# Patient Record
Sex: FEMALE | Race: OTHER | NOT HISPANIC OR LATINO
[De-identification: names, ages, dates, MRNs, and addresses within clinical notes are randomized per-mention and may not be internally consistent; named-entity substitution may affect disease eponyms.]

---

## 2019-01-15 PROBLEM — Z00.00 ENCOUNTER FOR PREVENTIVE HEALTH EXAMINATION: Status: ACTIVE | Noted: 2019-01-15

## 2019-02-12 ENCOUNTER — ASOB RESULT (OUTPATIENT)
Age: 39
End: 2019-02-12

## 2019-02-12 ENCOUNTER — APPOINTMENT (OUTPATIENT)
Dept: MATERNAL FETAL MEDICINE | Facility: CLINIC | Age: 39
End: 2019-02-12

## 2019-02-12 ENCOUNTER — APPOINTMENT (OUTPATIENT)
Dept: ANTEPARTUM | Facility: CLINIC | Age: 39
End: 2019-02-12
Payer: COMMERCIAL

## 2019-02-12 DIAGNOSIS — O35.1XX0 MATERNAL CARE FOR (SUSPECTED) CHROMOSOMAL ABNORMALITY IN FETUS, NOT APPLICABLE OR UNSPECIFIED: ICD-10-CM

## 2019-02-12 PROCEDURE — 76813 OB US NUCHAL MEAS 1 GEST: CPT

## 2019-02-12 PROCEDURE — 36416 COLLJ CAPILLARY BLOOD SPEC: CPT

## 2019-02-28 LAB
1ST TRIMESTER DATA: NORMAL
ADDENDUM DOC: NORMAL
AFP PNL SERPL: NORMAL
AFP SERPL-ACNC: NORMAL
CLINICAL BIOCHEMIST REVIEW: NORMAL
FREE BETA HCG 1ST TRIMESTER: NORMAL
Lab: NORMAL
NOTES NTD: NORMAL
NT: NORMAL
PAPP-A SERPL-ACNC: NORMAL
TRISOMY 18/3: NORMAL

## 2019-03-12 ENCOUNTER — APPOINTMENT (OUTPATIENT)
Dept: MATERNAL FETAL MEDICINE | Facility: CLINIC | Age: 39
End: 2019-03-12

## 2019-04-09 ENCOUNTER — APPOINTMENT (OUTPATIENT)
Dept: ANTEPARTUM | Facility: CLINIC | Age: 39
End: 2019-04-09

## 2019-04-20 ENCOUNTER — OUTPATIENT (OUTPATIENT)
Dept: INPATIENT UNIT | Facility: HOSPITAL | Age: 39
LOS: 1 days | Discharge: HOME | End: 2019-04-20

## 2019-04-20 VITALS — HEART RATE: 84 BPM | DIASTOLIC BLOOD PRESSURE: 77 MMHG | SYSTOLIC BLOOD PRESSURE: 132 MMHG

## 2019-04-20 VITALS
SYSTOLIC BLOOD PRESSURE: 117 MMHG | HEART RATE: 84 BPM | DIASTOLIC BLOOD PRESSURE: 74 MMHG | TEMPERATURE: 99 F | RESPIRATION RATE: 18 BRPM

## 2019-04-20 DIAGNOSIS — O03.80 UNSPECIFIED COMPLICATION FOLLOWING COMPLETE OR UNSPECIFIED SPONTANEOUS ABORTION: Chronic | ICD-10-CM

## 2019-04-20 DIAGNOSIS — O03.9 COMPLETE OR UNSPECIFIED SPONTANEOUS ABORTION WITHOUT COMPLICATION: Chronic | ICD-10-CM

## 2019-04-20 DIAGNOSIS — O26.899 OTHER SPECIFIED PREGNANCY RELATED CONDITIONS, UNSPECIFIED TRIMESTER: ICD-10-CM

## 2019-04-20 DIAGNOSIS — Z3A.00 WEEKS OF GESTATION OF PREGNANCY NOT SPECIFIED: ICD-10-CM

## 2019-04-20 DIAGNOSIS — Z33.2 ENCOUNTER FOR ELECTIVE TERMINATION OF PREGNANCY: Chronic | ICD-10-CM

## 2019-04-20 RX ORDER — INDOMETHACIN 50 MG
50 CAPSULE ORAL ONCE
Qty: 0 | Refills: 0 | Status: COMPLETED | OUTPATIENT
Start: 2019-04-20 | End: 2019-04-20

## 2019-04-20 RX ORDER — INDOMETHACIN 50 MG
25 CAPSULE ORAL
Qty: 8 | Refills: 0
Start: 2019-04-20 | End: 2019-04-21

## 2019-04-20 RX ADMIN — Medication 50 MILLIGRAM(S): at 13:36

## 2019-04-20 NOTE — OB PROVIDER TRIAGE NOTE - ADDITIONAL INSTRUCTIONS
discharge home with indocin 25 mg q 6 hr for 48 hours  -increase vaginal progesterone to 200 mg at night   -Follow up in ATU monday

## 2019-04-20 NOTE — OB PROVIDER TRIAGE NOTE - NSOBPROVIDERNOTE_OBGYN_ALL_OB_FT
Pt of Dr. Maisha Norton. 38 yo  @ 21 4/7 weeks sent in from the office for short cervix (1 cm with funneling in the office today) and for evaluation for rescue cerclage. Pt has been followed for short cervix. 2 weeks ago cl 2.8cm. Pt reports good fetal movement. Denies lof, or vaginal bleeding.     Current a/p complications: IVF on vaginal progesterone 100 mg at night , progesterone im (1ml?) every other day subchorionic hematoma- resolved. Twin pregnancy initially "vanishing twin"    Allergies: NKDA  Medications: PNV, vaginal progesterone 100 mg, progesterone 1 ml every other day   PMHx: Denies   PSHx: 2013 laparoscopic cervical polypectomy, 2016 D&C  OB: 2016 IVF trisomy 21 d&c 8 weeks   sab  3/4 weeks   GYN: hx of trichomonas   hx of fibroids   hx of ovarian cysts   Social: Denies   Psychosocial: Denies     Assessment/plan:  Vital Signs Last 24 Hrs  T(C): 37 (2019 11:09), Max: 37 (2019 11:09)  T(F): 98.6 (2019 11:09), Max: 98.6 (2019 11:09)  HR: 84 (2019 12:11) (84 - 84)  BP: 132/77 (2019 12:11) (117/74 - 132/77)  BP(mean): --  RR: 18 (2019 11:09) (18 - 18)    TAUS Breech presentation, posterior placenta, mvp 4.99 cm    SSE cervix appears closed     TVUS CL 1.5-1.8 cm funneling noted, no dynamic changes     irregular contractions noted by toco    d/w Dr. Chang,  -Give indocin 50 mg now, discharge home with indocin 25 mg q 6 hr for 48 hours  -increase vaginal progesterone to 200 mg at night   -Follow up in ATU monday     Plan d/w Dr. Maisha Norton

## 2019-04-20 NOTE — OB RN TRIAGE NOTE - PSH
, spontaneous  2018 IVF sab @ 3 weeks  2018 IVF sab @ 4 weeks  , spontaneous complete with complication   D&C for Trisomy 21 , spontaneous  2018 IVF sab @ 3 weeks  2018 IVF sab @ 4 weeks  Induced termination of pregnancy  trisomy 21 2016

## 2019-04-20 NOTE — OB RN TRIAGE NOTE - CURRENT PREGNANCY COMPLICATIONS, OB PROFILE
subchorionic hematoma resolved at 13 weeks vanishing twin, subchorionic hematoma resolved at 13 weeks

## 2019-04-22 ENCOUNTER — APPOINTMENT (OUTPATIENT)
Dept: ANTEPARTUM | Facility: CLINIC | Age: 39
End: 2019-04-22
Payer: COMMERCIAL

## 2019-04-22 ENCOUNTER — ASOB RESULT (OUTPATIENT)
Age: 39
End: 2019-04-22

## 2019-04-22 PROBLEM — N84.0 POLYP OF CORPUS UTERI: Chronic | Status: ACTIVE | Noted: 2019-04-20

## 2019-04-22 PROBLEM — N83.209 UNSPECIFIED OVARIAN CYST, UNSPECIFIED SIDE: Chronic | Status: ACTIVE | Noted: 2019-04-20

## 2019-04-22 PROCEDURE — 99242 OFF/OP CONSLTJ NEW/EST SF 20: CPT | Mod: 25

## 2019-04-22 PROCEDURE — 76811 OB US DETAILED SNGL FETUS: CPT

## 2019-04-22 PROCEDURE — 76817 TRANSVAGINAL US OBSTETRIC: CPT

## 2019-04-22 PROCEDURE — 59020 FETAL CONTRACT STRESS TEST: CPT

## 2019-04-24 ENCOUNTER — APPOINTMENT (OUTPATIENT)
Dept: ANTEPARTUM | Facility: CLINIC | Age: 39
End: 2019-04-24
Payer: COMMERCIAL

## 2019-04-24 ENCOUNTER — ASOB RESULT (OUTPATIENT)
Age: 39
End: 2019-04-24

## 2019-04-24 PROCEDURE — 76817 TRANSVAGINAL US OBSTETRIC: CPT

## 2019-05-01 ENCOUNTER — APPOINTMENT (OUTPATIENT)
Dept: ANTEPARTUM | Facility: CLINIC | Age: 39
End: 2019-05-01
Payer: COMMERCIAL

## 2019-05-01 ENCOUNTER — ASOB RESULT (OUTPATIENT)
Age: 39
End: 2019-05-01

## 2019-05-01 PROCEDURE — 76817 TRANSVAGINAL US OBSTETRIC: CPT

## 2019-05-01 PROCEDURE — 76815 OB US LIMITED FETUS(S): CPT

## 2019-07-18 ENCOUNTER — TRANSCRIPTION ENCOUNTER (OUTPATIENT)
Age: 39
End: 2019-07-18

## 2019-07-19 ENCOUNTER — INPATIENT (INPATIENT)
Facility: HOSPITAL | Age: 39
LOS: 2 days | Discharge: HOME | End: 2019-07-22
Attending: OBSTETRICS & GYNECOLOGY | Admitting: OBSTETRICS & GYNECOLOGY
Payer: COMMERCIAL

## 2019-07-19 ENCOUNTER — RESULT REVIEW (OUTPATIENT)
Age: 39
End: 2019-07-19

## 2019-07-19 VITALS
RESPIRATION RATE: 18 BRPM | DIASTOLIC BLOOD PRESSURE: 88 MMHG | SYSTOLIC BLOOD PRESSURE: 143 MMHG | HEART RATE: 97 BPM | TEMPERATURE: 99 F

## 2019-07-19 DIAGNOSIS — O03.9 COMPLETE OR UNSPECIFIED SPONTANEOUS ABORTION WITHOUT COMPLICATION: Chronic | ICD-10-CM

## 2019-07-19 DIAGNOSIS — O26.899 OTHER SPECIFIED PREGNANCY RELATED CONDITIONS, UNSPECIFIED TRIMESTER: ICD-10-CM

## 2019-07-19 DIAGNOSIS — Z3A.00 WEEKS OF GESTATION OF PREGNANCY NOT SPECIFIED: ICD-10-CM

## 2019-07-19 DIAGNOSIS — Z33.2 ENCOUNTER FOR ELECTIVE TERMINATION OF PREGNANCY: Chronic | ICD-10-CM

## 2019-07-19 LAB
BASOPHILS # BLD AUTO: 0.05 K/UL — SIGNIFICANT CHANGE UP (ref 0–0.2)
BASOPHILS NFR BLD AUTO: 0.6 % — SIGNIFICANT CHANGE UP (ref 0–2)
BLD GP AB SCN SERPL QL: NEGATIVE — SIGNIFICANT CHANGE UP
EOSINOPHIL # BLD AUTO: 0.12 K/UL — SIGNIFICANT CHANGE UP (ref 0–0.5)
EOSINOPHIL NFR BLD AUTO: 1.3 % — SIGNIFICANT CHANGE UP (ref 0–6)
HCT VFR BLD CALC: 34.3 % — LOW (ref 34.5–45)
HGB BLD-MCNC: 10.6 G/DL — LOW (ref 11.5–15.5)
HIV COMBO RESULT: SIGNIFICANT CHANGE UP
HIV1+2 AB SPEC QL: SIGNIFICANT CHANGE UP
IMM GRANULOCYTES NFR BLD AUTO: 1.7 % — HIGH (ref 0–1.5)
LYMPHOCYTES # BLD AUTO: 1.86 K/UL — SIGNIFICANT CHANGE UP (ref 1–3.3)
LYMPHOCYTES # BLD AUTO: 20.8 % — SIGNIFICANT CHANGE UP (ref 13–44)
MCHC RBC-ENTMCNC: 26.7 PG — LOW (ref 27–34)
MCHC RBC-ENTMCNC: 30.9 % — LOW (ref 32–36)
MCV RBC AUTO: 86.4 FL — SIGNIFICANT CHANGE UP (ref 80–100)
MONOCYTES # BLD AUTO: 0.59 K/UL — SIGNIFICANT CHANGE UP (ref 0–0.9)
MONOCYTES NFR BLD AUTO: 6.6 % — SIGNIFICANT CHANGE UP (ref 2–14)
NEUTROPHILS # BLD AUTO: 6.19 K/UL — SIGNIFICANT CHANGE UP (ref 1.8–7.4)
NEUTROPHILS NFR BLD AUTO: 69 % — SIGNIFICANT CHANGE UP (ref 43–77)
NRBC # FLD: 0 K/UL — SIGNIFICANT CHANGE UP (ref 0–0)
PLATELET # BLD AUTO: 262 K/UL — SIGNIFICANT CHANGE UP (ref 150–400)
PMV BLD: 9.9 FL — SIGNIFICANT CHANGE UP (ref 7–13)
RBC # BLD: 3.97 M/UL — SIGNIFICANT CHANGE UP (ref 3.8–5.2)
RBC # FLD: 14.3 % — SIGNIFICANT CHANGE UP (ref 10.3–14.5)
RH IG SCN BLD-IMP: POSITIVE — SIGNIFICANT CHANGE UP
WBC # BLD: 8.96 K/UL — SIGNIFICANT CHANGE UP (ref 3.8–10.5)
WBC # FLD AUTO: 8.96 K/UL — SIGNIFICANT CHANGE UP (ref 3.8–10.5)

## 2019-07-19 PROCEDURE — 88307 TISSUE EXAM BY PATHOLOGIST: CPT | Mod: 26

## 2019-07-19 PROCEDURE — 59514 CESAREAN DELIVERY ONLY: CPT | Mod: U7

## 2019-07-19 RX ORDER — OXYTOCIN 10 UNIT/ML
41.67 VIAL (ML) INJECTION
Qty: 20 | Refills: 0 | Status: DISCONTINUED | OUTPATIENT
Start: 2019-07-19 | End: 2019-07-20

## 2019-07-19 RX ORDER — MAGNESIUM HYDROXIDE 400 MG/1
30 TABLET, CHEWABLE ORAL
Refills: 0 | Status: DISCONTINUED | OUTPATIENT
Start: 2019-07-19 | End: 2019-07-22

## 2019-07-19 RX ORDER — CEFAZOLIN SODIUM 1 G
2000 VIAL (EA) INJECTION ONCE
Refills: 0 | Status: DISCONTINUED | OUTPATIENT
Start: 2019-07-19 | End: 2019-07-22

## 2019-07-19 RX ORDER — KETOROLAC TROMETHAMINE 30 MG/ML
30 SYRINGE (ML) INJECTION EVERY 6 HOURS
Refills: 0 | Status: DISCONTINUED | OUTPATIENT
Start: 2019-07-19 | End: 2019-07-20

## 2019-07-19 RX ORDER — CITRIC ACID/SODIUM CITRATE 300-500 MG
30 SOLUTION, ORAL ORAL ONCE
Refills: 0 | Status: DISCONTINUED | OUTPATIENT
Start: 2019-07-19 | End: 2019-07-19

## 2019-07-19 RX ORDER — GLYCERIN ADULT
1 SUPPOSITORY, RECTAL RECTAL AT BEDTIME
Refills: 0 | Status: DISCONTINUED | OUTPATIENT
Start: 2019-07-19 | End: 2019-07-22

## 2019-07-19 RX ORDER — METOCLOPRAMIDE HCL 10 MG
10 TABLET ORAL ONCE
Refills: 0 | Status: DISCONTINUED | OUTPATIENT
Start: 2019-07-20 | End: 2019-07-20

## 2019-07-19 RX ORDER — OXYCODONE HYDROCHLORIDE 5 MG/1
10 TABLET ORAL
Refills: 0 | Status: DISCONTINUED | OUTPATIENT
Start: 2019-07-20 | End: 2019-07-20

## 2019-07-19 RX ORDER — LANOLIN
1 OINTMENT (GRAM) TOPICAL EVERY 6 HOURS
Refills: 0 | Status: DISCONTINUED | OUTPATIENT
Start: 2019-07-19 | End: 2019-07-22

## 2019-07-19 RX ORDER — ONDANSETRON 8 MG/1
4 TABLET, FILM COATED ORAL EVERY 6 HOURS
Refills: 0 | Status: DISCONTINUED | OUTPATIENT
Start: 2019-07-20 | End: 2019-07-20

## 2019-07-19 RX ORDER — OXYTOCIN 10 UNIT/ML
333.33 VIAL (ML) INJECTION
Qty: 20 | Refills: 0 | Status: DISCONTINUED | OUTPATIENT
Start: 2019-07-19 | End: 2019-07-20

## 2019-07-19 RX ORDER — FAMOTIDINE 10 MG/ML
20 INJECTION INTRAVENOUS ONCE
Refills: 0 | Status: DISCONTINUED | OUTPATIENT
Start: 2019-07-19 | End: 2019-07-19

## 2019-07-19 RX ORDER — OXYCODONE HYDROCHLORIDE 5 MG/1
5 TABLET ORAL
Refills: 0 | Status: DISCONTINUED | OUTPATIENT
Start: 2019-07-20 | End: 2019-07-20

## 2019-07-19 RX ORDER — IBUPROFEN 200 MG
600 TABLET ORAL EVERY 6 HOURS
Refills: 0 | Status: COMPLETED | OUTPATIENT
Start: 2019-07-19 | End: 2020-06-16

## 2019-07-19 RX ORDER — NALOXONE HYDROCHLORIDE 4 MG/.1ML
0.1 SPRAY NASAL
Refills: 0 | Status: DISCONTINUED | OUTPATIENT
Start: 2019-07-20 | End: 2019-07-20

## 2019-07-19 RX ORDER — SODIUM CHLORIDE 9 MG/ML
1000 INJECTION, SOLUTION INTRAVENOUS
Refills: 0 | Status: DISCONTINUED | OUTPATIENT
Start: 2019-07-19 | End: 2019-07-20

## 2019-07-19 RX ORDER — SIMETHICONE 80 MG/1
80 TABLET, CHEWABLE ORAL EVERY 4 HOURS
Refills: 0 | Status: DISCONTINUED | OUTPATIENT
Start: 2019-07-19 | End: 2019-07-22

## 2019-07-19 RX ORDER — METOCLOPRAMIDE HCL 10 MG
10 TABLET ORAL ONCE
Refills: 0 | Status: DISCONTINUED | OUTPATIENT
Start: 2019-07-19 | End: 2019-07-19

## 2019-07-19 RX ORDER — OXYTOCIN 10 UNIT/ML
333.33 VIAL (ML) INJECTION
Qty: 20 | Refills: 0 | Status: DISCONTINUED | OUTPATIENT
Start: 2019-07-19 | End: 2019-07-19

## 2019-07-19 RX ORDER — DIPHENHYDRAMINE HCL 50 MG
25 CAPSULE ORAL EVERY 6 HOURS
Refills: 0 | Status: DISCONTINUED | OUTPATIENT
Start: 2019-07-19 | End: 2019-07-22

## 2019-07-19 RX ORDER — ACETAMINOPHEN 500 MG
975 TABLET ORAL
Refills: 0 | Status: DISCONTINUED | OUTPATIENT
Start: 2019-07-19 | End: 2019-07-22

## 2019-07-19 RX ORDER — SODIUM CHLORIDE 9 MG/ML
1000 INJECTION, SOLUTION INTRAVENOUS ONCE
Refills: 0 | Status: DISCONTINUED | OUTPATIENT
Start: 2019-07-19 | End: 2019-07-19

## 2019-07-19 RX ORDER — OXYCODONE HYDROCHLORIDE 5 MG/1
5 TABLET ORAL ONCE
Refills: 0 | Status: DISCONTINUED | OUTPATIENT
Start: 2019-07-19 | End: 2019-07-22

## 2019-07-19 RX ORDER — DOCUSATE SODIUM 100 MG
100 CAPSULE ORAL
Refills: 0 | Status: DISCONTINUED | OUTPATIENT
Start: 2019-07-19 | End: 2019-07-22

## 2019-07-19 RX ORDER — TETANUS TOXOID, REDUCED DIPHTHERIA TOXOID AND ACELLULAR PERTUSSIS VACCINE, ADSORBED 5; 2.5; 8; 8; 2.5 [IU]/.5ML; [IU]/.5ML; UG/.5ML; UG/.5ML; UG/.5ML
0.5 SUSPENSION INTRAMUSCULAR ONCE
Refills: 0 | Status: DISCONTINUED | OUTPATIENT
Start: 2019-07-19 | End: 2019-07-22

## 2019-07-19 RX ORDER — AZITHROMYCIN 500 MG/1
500 TABLET, FILM COATED ORAL ONCE
Refills: 0 | Status: DISCONTINUED | OUTPATIENT
Start: 2019-07-19 | End: 2019-07-19

## 2019-07-19 RX ORDER — OXYCODONE HYDROCHLORIDE 5 MG/1
5 TABLET ORAL
Refills: 0 | Status: DISCONTINUED | OUTPATIENT
Start: 2019-07-19 | End: 2019-07-22

## 2019-07-19 RX ORDER — SODIUM CHLORIDE 9 MG/ML
1000 INJECTION, SOLUTION INTRAVENOUS
Refills: 0 | Status: DISCONTINUED | OUTPATIENT
Start: 2019-07-19 | End: 2019-07-19

## 2019-07-19 RX ORDER — HYDROMORPHONE HYDROCHLORIDE 2 MG/ML
1 INJECTION INTRAMUSCULAR; INTRAVENOUS; SUBCUTANEOUS
Refills: 0 | Status: DISCONTINUED | OUTPATIENT
Start: 2019-07-20 | End: 2019-07-20

## 2019-07-19 RX ADMIN — FAMOTIDINE 20 MILLIGRAM(S): 10 INJECTION INTRAVENOUS at 20:26

## 2019-07-19 RX ADMIN — Medication 125 MILLIUNIT(S)/MIN: at 23:43

## 2019-07-19 RX ADMIN — Medication 30 MILLILITER(S): at 20:26

## 2019-07-19 RX ADMIN — SODIUM CHLORIDE 75 MILLILITER(S): 9 INJECTION, SOLUTION INTRAVENOUS at 23:42

## 2019-07-19 NOTE — OB PROVIDER TRIAGE NOTE - HISTORY OF PRESENT ILLNESS
Patient is a 40y/o  @ 34 3/7wks gest.  who reports to triage with c/o ruptured membranes since 1800 with mild contractions (2/10 on the pain scale).   Reports good fetal movements.  Pregnancy is a result of IVF.  Patient last ate @ 1500.      AP Course - GDMA1      - Short cervix; on progesterone      - vanishing twin @ 5wks gest.      - subchorionic hematoma @ 13 wks.  Meds - pnv & progesterone  NKDA

## 2019-07-19 NOTE — OB PROVIDER H&P - ASSESSMENT
Patient is a 38y/o  @ 34 3/7wks gest. with srom @ 1800 in labor.  For 1' C/S for breech presentation.

## 2019-07-19 NOTE — OB RN TRIAGE NOTE - PSH
, spontaneous  2018 IVF sab @ 3 weeks  2018 IVF sab @ 4 weeks  Induced termination of pregnancy  trisomy 21 2016

## 2019-07-19 NOTE — OB PROVIDER TRIAGE NOTE - NSHPPHYSICALEXAM_GEN_ALL_CORE
Abdomen gravid, soft and nontender  Grossly ruptured; clear fluid  SVE - 3/70/-3  Complete breech presentation  EFW today in 6lbs 1oz  GBS - unknown

## 2019-07-19 NOTE — OB PROVIDER TRIAGE NOTE - NS_OBGYNHISTORY_OBGYN_ALL_OB_FT
OB  - SAB -  2016  - SAB of IVF fetus - 9 /2018 OB  - SAB -  2016  - SAB of IVF fetus - 9 /2018  GYN  -ovarian cyst removed - 2014  -uterine/cervical polypectomy - 2013

## 2019-07-19 NOTE — OB RN TRIAGE NOTE - CURRENT PREGNANCY COMPLICATIONS, OB PROFILE
Gestational Diabetes/vanishing twin, subchorionic hematoma resolved at  13weeks, short cervix , breech Gestational Diabetes/Gestational Age less than 36 Weeks/vanishing twin, subchorionic hematoma resolved at  13weeks, short cervix , breech

## 2019-07-19 NOTE — OB PROVIDER H&P - NS_OBGYNHISTORY_OBGYN_ALL_OB_FT
OB  - SAB -  2016  - SAB of IVF fetus - 9 /2018  GYN  -ovarian cyst removed - 2014  -uterine/cervical polypectomy - 2013

## 2019-07-19 NOTE — OB PROVIDER TRIAGE NOTE - NSOBPROVIDERNOTE_OBGYN_ALL_OB_FT
Patient is a 38y/o  @ 34 3/7wks gest.  who reports to triage with c/o ruptured membranes since 1800 with mild contractions (2/10 on the pain scale).   Reports good fetal movements.  Pregnancy is a result of IVF.  Patient last ate @ 1500.      AP Course - GDMA1      - Short cervix; on progesterone      - vanishing twin @ 5wks gest.      - subchorionic hematoma @ 13 wks.  Meds - pnv & progesterone  NKDA    PMH/SH - denies  OB  - SAB -    - SAB of IVF fetus -   GYN  -ovarian cyst removed -   -uterine/cervical polypectomy -     Abdomen gravid, soft and nontender  Grossly ruptured; clear fluid  TAS - comp breech/post plac  SVE - 3/70/-3  Complete breech presentation  EFW today in 6lbs 1oz  GBS - unknown    D/w Dr Gibbons-Edin  Patient is for C/S.  Rep SVE 5cm  Anesthesia and charge nurse aware. Patient is a 40y/o  @ 34 3/7wks gest.  who reports to triage with c/o ruptured membranes since 1800 with mild contractions (2/10 on the pain scale).   Reports good fetal movements.  Pregnancy is a result of IVF.  Patient last ate @ 1500.      AP Course - GDMA1      - Short cervix; on progesterone      - vanishing twin @ 5wks gest.      - subchorionic hematoma @ 13 wks.  Meds - pnv & progesterone  NKDA    PMH/SH - denies  OB  - SAB -    --ETOP of K67secim -    - SAB of IVF fetus -   GYN  -ovarian cyst removed -   -uterine/cervical polypectomy -     Abdomen gravid, soft and nontender  Grossly ruptured; clear fluid  TAS - comp breech/post plac  SVE - 3/70/-3  Complete breech presentation  EFW today in 6lbs 1oz  GBS - unknown    D/w Dr Gibbons-Edin  Patient is for C/S.  Rep SVE 5cm  Anesthesia and charge nurse aware.

## 2019-07-19 NOTE — OB NEONATOLOGY/PEDIATRICIAN DELIVERY SUMMARY - NSPEDSNEONOTESA_OBGYN_ALL_OB_FT
Baby boy born at 34.3 wks GA to a  mother via c/s for breech presentation. Maternal h/o GMA1. PNC s/f IVF pregnancy. PNL: HIV pending, remainder NNRI. GBS u/k. O+. SROM 1800 clear. NICU team present at delivery, prolonged extraction 2/2 breech delivery. Infant emerged limp, poor color. Brought to warmer, d/s/s. HR >100. PPV initiated, continued until 1 MOL, 100% at which time infant cried, tone/color improving. Sats at 5 MOL >90%. Intermittently tachypneic and grunting, but not requiring CPAP, transferred to NICU for further evaluation on RA.

## 2019-07-19 NOTE — OB PROVIDER DELIVERY SUMMARY - NSPROVIDERDELIVERYNOTE_OBGYN_ALL_OB_FT
Uncomplicated pLTCS.  Small 1 cm extension made vertically upward on the uterus to help facilitate delivery of fetal vertex.     Fibrillar placed over hysterotomy  Viable infant, Apgars 7/8

## 2019-07-19 NOTE — OB PROVIDER H&P - NS_FETALCOMPOTHER_OBGYN_ALL_OB_FT
Unable to contact patient re scheduling annual exam with Dr. Malik. Letter mailed.   srom breech presentation

## 2019-07-19 NOTE — OB PROVIDER TRIAGE NOTE - CURRENT PREGNANCY COMPLICATIONS, OB PROFILE
vanishing twin, subchorionic hematoma resolved at  13weeks, short cervix , breech/Gestational Diabetes

## 2019-07-19 NOTE — OB PROVIDER H&P - CURRENT PREGNANCY COMPLICATIONS, OB PROFILE
Gestational Diabetes/vanishing twin, subchorionic hematoma resolved at  13weeks, short cervix , breech

## 2019-07-19 NOTE — OB PROVIDER H&P - NSHPREVIEWOFSYSTEMS_GEN_ALL_CORE
Abdomen gravid, soft and nontender  Grossly ruptured; clear fluid  TAS - comp breech/post plac  SVE - 3/70/-3  Complete breech presentation  EFW today in 6lbs 1oz  GBS - unknown

## 2019-07-20 DIAGNOSIS — O24.419 GESTATIONAL DIABETES MELLITUS IN PREGNANCY, UNSPECIFIED CONTROL: ICD-10-CM

## 2019-07-20 DIAGNOSIS — O42.90 PREMATURE RUPTURE OF MEMBRANES, UNSPECIFIED AS TO LENGTH OF TIME BETWEEN RUPTURE AND ONSET OF LABOR, UNSPECIFIED WEEKS OF GESTATION: ICD-10-CM

## 2019-07-20 DIAGNOSIS — N97.9 FEMALE INFERTILITY, UNSPECIFIED: ICD-10-CM

## 2019-07-20 LAB
BASOPHILS # BLD AUTO: 0.05 K/UL — SIGNIFICANT CHANGE UP (ref 0–0.2)
BASOPHILS NFR BLD AUTO: 0.5 % — SIGNIFICANT CHANGE UP (ref 0–2)
EOSINOPHIL # BLD AUTO: 0.08 K/UL — SIGNIFICANT CHANGE UP (ref 0–0.5)
EOSINOPHIL NFR BLD AUTO: 0.7 % — SIGNIFICANT CHANGE UP (ref 0–6)
HCT VFR BLD CALC: 31.2 % — LOW (ref 34.5–45)
HGB BLD-MCNC: 9.7 G/DL — LOW (ref 11.5–15.5)
IMM GRANULOCYTES NFR BLD AUTO: 0.8 % — SIGNIFICANT CHANGE UP (ref 0–1.5)
LYMPHOCYTES # BLD AUTO: 1.58 K/UL — SIGNIFICANT CHANGE UP (ref 1–3.3)
LYMPHOCYTES # BLD AUTO: 14.4 % — SIGNIFICANT CHANGE UP (ref 13–44)
MCHC RBC-ENTMCNC: 26.6 PG — LOW (ref 27–34)
MCHC RBC-ENTMCNC: 31.1 % — LOW (ref 32–36)
MCV RBC AUTO: 85.7 FL — SIGNIFICANT CHANGE UP (ref 80–100)
MONOCYTES # BLD AUTO: 0.8 K/UL — SIGNIFICANT CHANGE UP (ref 0–0.9)
MONOCYTES NFR BLD AUTO: 7.3 % — SIGNIFICANT CHANGE UP (ref 2–14)
NEUTROPHILS # BLD AUTO: 8.35 K/UL — HIGH (ref 1.8–7.4)
NEUTROPHILS NFR BLD AUTO: 76.3 % — SIGNIFICANT CHANGE UP (ref 43–77)
NRBC # FLD: 0 K/UL — SIGNIFICANT CHANGE UP (ref 0–0)
PLATELET # BLD AUTO: 221 K/UL — SIGNIFICANT CHANGE UP (ref 150–400)
PMV BLD: 10 FL — SIGNIFICANT CHANGE UP (ref 7–13)
RBC # BLD: 3.64 M/UL — LOW (ref 3.8–5.2)
RBC # FLD: 14.2 % — SIGNIFICANT CHANGE UP (ref 10.3–14.5)
T PALLIDUM AB TITR SER: NEGATIVE — SIGNIFICANT CHANGE UP
WBC # BLD: 10.95 K/UL — HIGH (ref 3.8–10.5)
WBC # FLD AUTO: 10.95 K/UL — HIGH (ref 3.8–10.5)

## 2019-07-20 RX ORDER — METOCLOPRAMIDE HCL 10 MG
10 TABLET ORAL ONCE
Refills: 0 | Status: DISCONTINUED | OUTPATIENT
Start: 2019-07-20 | End: 2019-07-20

## 2019-07-20 RX ORDER — HEPARIN SODIUM 5000 [USP'U]/ML
5000 INJECTION INTRAVENOUS; SUBCUTANEOUS EVERY 12 HOURS
Refills: 0 | Status: DISCONTINUED | OUTPATIENT
Start: 2019-07-20 | End: 2019-07-22

## 2019-07-20 RX ORDER — CITRIC ACID/SODIUM CITRATE 300-500 MG
30 SOLUTION, ORAL ORAL ONCE
Refills: 0 | Status: COMPLETED | OUTPATIENT
Start: 2019-07-20 | End: 2019-07-19

## 2019-07-20 RX ORDER — ASCORBIC ACID 60 MG
500 TABLET,CHEWABLE ORAL DAILY
Refills: 0 | Status: DISCONTINUED | OUTPATIENT
Start: 2019-07-20 | End: 2019-07-22

## 2019-07-20 RX ORDER — FAMOTIDINE 10 MG/ML
20 INJECTION INTRAVENOUS ONCE
Refills: 0 | Status: COMPLETED | OUTPATIENT
Start: 2019-07-20 | End: 2019-07-19

## 2019-07-20 RX ADMIN — Medication 30 MILLIGRAM(S): at 10:57

## 2019-07-20 RX ADMIN — Medication 975 MILLIGRAM(S): at 17:12

## 2019-07-20 RX ADMIN — HEPARIN SODIUM 5000 UNIT(S): 5000 INJECTION INTRAVENOUS; SUBCUTANEOUS at 17:12

## 2019-07-20 RX ADMIN — Medication 30 MILLIGRAM(S): at 12:22

## 2019-07-20 RX ADMIN — Medication 30 MILLIGRAM(S): at 21:37

## 2019-07-20 RX ADMIN — Medication 30 MILLIGRAM(S): at 03:48

## 2019-07-20 RX ADMIN — Medication 975 MILLIGRAM(S): at 19:00

## 2019-07-20 RX ADMIN — Medication 30 MILLIGRAM(S): at 04:18

## 2019-07-20 RX ADMIN — HEPARIN SODIUM 5000 UNIT(S): 5000 INJECTION INTRAVENOUS; SUBCUTANEOUS at 06:22

## 2019-07-20 RX ADMIN — Medication 975 MILLIGRAM(S): at 06:23

## 2019-07-20 RX ADMIN — Medication 30 MILLIGRAM(S): at 21:07

## 2019-07-20 RX ADMIN — Medication 975 MILLIGRAM(S): at 06:53

## 2019-07-20 NOTE — PROGRESS NOTE ADULT - ASSESSMENT
A/P: 40yo POD#1 s/p pLTCS.  Patient is stable and doing well post-operatively.    - Continue regular diet.  - Increase ambulation.  - Continue motrin, tylenol, oxycodone PRN for pain control.   - Plans condoms for PP contraception     Luli Garcia PGY-1

## 2019-07-20 NOTE — PROGRESS NOTE ADULT - SUBJECTIVE AND OBJECTIVE BOX
OB Postpartum Note:  Delivery    S: 40yo  now POD1 s/p pLTCS for breech presentation. Her pain is well controlled. She is tolerating a regular diet and passing flatus. Voiding spontaneously and ambulating without difficulty. Denies N/V. Denies CP/SOB/lightheadedness/dizziness.     O:   Vitals:  Vital Signs Last 24 Hrs  T(C): 37.2 (2019 01:30), Max: 37.2 (2019 20:06)  T(F): 98.9 (2019 01:30), Max: 98.96 (2019 20:06)  HR: 89 (2019 01:30) (78 - 99)  BP: 119/69 (2019 01:30) (99/65 - 143/88)  BP(mean): 73 (2019 00:05) (71 - 87)  RR: 20 (2019 01:30) (16 - 24)  SpO2: 99% (2019 01:30) (98% - 100%)    MEDICATIONS  (STANDING):  acetaminophen   Tablet .. 975 milliGRAM(s) Oral <User Schedule>  ascorbic acid 500 milliGRAM(s) Oral daily  ceFAZolin   IVPB 2000 milliGRAM(s) IV Intermittent once  diphtheria/tetanus/pertussis (acellular) Vaccine (ADAcel) 0.5 milliLiter(s) IntraMuscular once  heparin  Injectable 5000 Unit(s) SubCutaneous every 12 hours  ibuprofen  Tablet. 600 milliGRAM(s) Oral every 6 hours  ketorolac   Injectable 30 milliGRAM(s) IV Push every 6 hours  oxytocin Infusion 41.667 milliUNIT(s)/Min (125 mL/Hr) IV Continuous <Continuous>  prenatal multivitamin 1 Tablet(s) Oral daily    MEDICATIONS  (PRN):  diphenhydrAMINE 25 milliGRAM(s) Oral every 6 hours PRN Itching  docusate sodium 100 milliGRAM(s) Oral two times a day PRN Stool softening  glycerin Suppository - Adult 1 Suppository(s) Rectal at bedtime PRN Constipation  HYDROmorphone  Injectable 1 milliGRAM(s) IV Push every 3 hours PRN Severe Pain (7 - 10)  lanolin Ointment 1 Application(s) Topical every 6 hours PRN Sore Nipples  magnesium hydroxide Suspension 30 milliLiter(s) Oral two times a day PRN Constipation  metoclopramide Injectable 10 milliGRAM(s) IV Push once PRN Nausea and/or Vomiting  naloxone Injectable 0.1 milliGRAM(s) IV Push every 3 minutes PRN For ANY of the following changes in patient status:  A. RR LESS THAN 10 breaths per minute, B. Oxygen saturation LESS THAN 90%, C. Sedation score of 6  ondansetron Injectable 4 milliGRAM(s) IV Push every 6 hours PRN Nausea  oxyCODONE    IR 5 milliGRAM(s) Oral every 3 hours PRN Mild Pain (1 - 3)  oxyCODONE    IR 10 milliGRAM(s) Oral every 3 hours PRN Moderate Pain (4 - 6)  oxyCODONE    IR 5 milliGRAM(s) Oral every 3 hours PRN Moderate to Severe Pain (4-10)  oxyCODONE    IR 5 milliGRAM(s) Oral once PRN Moderate to Severe Pain (4-10)  simethicone 80 milliGRAM(s) Chew every 4 hours PRN Gas      Labs:  Blood type: O Positive  Rubella IgG: RPR:                           10.6<L>   8.96 >-----------< 262    (  @ 20:20 )             34.3<L>        PE:  General: NAD, patient resting comfortably in bed  Abdomen: mildly distended,appropriately tender, incision c/d/i.  Extremities: SCDs in place, no erythema OB Postpartum Note:  Delivery    S: 38yo  now POD1 s/p pLTCS for breech presentation. Her pain is well controlled. She is tolerating a regular diet and passing flatus. She has not yet voided spontaneously but states her josue was recently removed. Does not yet feel urge to void.  ambulating without difficulty. Denies N/V. Denies CP/SOB/lightheadedness/dizziness.     O:   Vitals:  Vital Signs Last 24 Hrs  T(C): 37.2 (2019 01:30), Max: 37.2 (2019 20:06)  T(F): 98.9 (2019 01:30), Max: 98.96 (2019 20:06)  HR: 89 (:30) (78 - 99)  BP: 119/69 (2019 01:30) (99/65 - 143/88)  BP(mean): 73 (2019 00:05) (71 - 87)  RR: 20 (:30) (16 - 24)  SpO2: 99% (2019 01:30) (98% - 100%)    MEDICATIONS  (STANDING):  acetaminophen   Tablet .. 975 milliGRAM(s) Oral <User Schedule>  ascorbic acid 500 milliGRAM(s) Oral daily  ceFAZolin   IVPB 2000 milliGRAM(s) IV Intermittent once  diphtheria/tetanus/pertussis (acellular) Vaccine (ADAcel) 0.5 milliLiter(s) IntraMuscular once  heparin  Injectable 5000 Unit(s) SubCutaneous every 12 hours  ibuprofen  Tablet. 600 milliGRAM(s) Oral every 6 hours  ketorolac   Injectable 30 milliGRAM(s) IV Push every 6 hours  oxytocin Infusion 41.667 milliUNIT(s)/Min (125 mL/Hr) IV Continuous <Continuous>  prenatal multivitamin 1 Tablet(s) Oral daily    MEDICATIONS  (PRN):  diphenhydrAMINE 25 milliGRAM(s) Oral every 6 hours PRN Itching  docusate sodium 100 milliGRAM(s) Oral two times a day PRN Stool softening  glycerin Suppository - Adult 1 Suppository(s) Rectal at bedtime PRN Constipation  HYDROmorphone  Injectable 1 milliGRAM(s) IV Push every 3 hours PRN Severe Pain (7 - 10)  lanolin Ointment 1 Application(s) Topical every 6 hours PRN Sore Nipples  magnesium hydroxide Suspension 30 milliLiter(s) Oral two times a day PRN Constipation  metoclopramide Injectable 10 milliGRAM(s) IV Push once PRN Nausea and/or Vomiting  naloxone Injectable 0.1 milliGRAM(s) IV Push every 3 minutes PRN For ANY of the following changes in patient status:  A. RR LESS THAN 10 breaths per minute, B. Oxygen saturation LESS THAN 90%, C. Sedation score of 6  ondansetron Injectable 4 milliGRAM(s) IV Push every 6 hours PRN Nausea  oxyCODONE    IR 5 milliGRAM(s) Oral every 3 hours PRN Mild Pain (1 - 3)  oxyCODONE    IR 10 milliGRAM(s) Oral every 3 hours PRN Moderate Pain (4 - 6)  oxyCODONE    IR 5 milliGRAM(s) Oral every 3 hours PRN Moderate to Severe Pain (4-10)  oxyCODONE    IR 5 milliGRAM(s) Oral once PRN Moderate to Severe Pain (4-10)  simethicone 80 milliGRAM(s) Chew every 4 hours PRN Gas      Labs:  Blood type: O Positive  Rubella IgG: RPR:                           10.6<L>   8.96 >-----------< 262    (  @ 20:20 )             34.3<L>        PE:  General: NAD, patient resting comfortably in bed  Abdomen: mildly distended,appropriately tender, incision c/d/i.  Extremities: SCDs in place, no erythema

## 2019-07-20 NOTE — PROGRESS NOTE ADULT - ATTENDING COMMENTS
PRIMARY c/S prom 35 WEEKs GDM IVF BREECH PRIMARY c/S prom 35 WEEKs GDM IVF BREECH    Service attending addendum - attempted to make rounds on patient, who was in the NICU. Per resident and nursing team, patient stable POD#1.   -BERNARDA Espinoza DO

## 2019-07-21 RX ORDER — IBUPROFEN 200 MG
600 TABLET ORAL EVERY 6 HOURS
Refills: 0 | Status: DISCONTINUED | OUTPATIENT
Start: 2019-07-21 | End: 2019-07-22

## 2019-07-21 RX ADMIN — Medication 600 MILLIGRAM(S): at 07:17

## 2019-07-21 RX ADMIN — Medication 975 MILLIGRAM(S): at 00:47

## 2019-07-21 RX ADMIN — HEPARIN SODIUM 5000 UNIT(S): 5000 INJECTION INTRAVENOUS; SUBCUTANEOUS at 06:47

## 2019-07-21 RX ADMIN — Medication 600 MILLIGRAM(S): at 06:47

## 2019-07-21 RX ADMIN — Medication 100 MILLIGRAM(S): at 00:17

## 2019-07-21 RX ADMIN — Medication 600 MILLIGRAM(S): at 15:00

## 2019-07-21 RX ADMIN — SIMETHICONE 80 MILLIGRAM(S): 80 TABLET, CHEWABLE ORAL at 00:17

## 2019-07-21 RX ADMIN — Medication 600 MILLIGRAM(S): at 18:37

## 2019-07-21 RX ADMIN — Medication 975 MILLIGRAM(S): at 00:17

## 2019-07-21 RX ADMIN — HEPARIN SODIUM 5000 UNIT(S): 5000 INJECTION INTRAVENOUS; SUBCUTANEOUS at 18:37

## 2019-07-21 RX ADMIN — MAGNESIUM HYDROXIDE 30 MILLILITER(S): 400 TABLET, CHEWABLE ORAL at 23:51

## 2019-07-21 RX ADMIN — Medication 600 MILLIGRAM(S): at 23:51

## 2019-07-21 RX ADMIN — Medication 600 MILLIGRAM(S): at 14:07

## 2019-07-21 NOTE — PROGRESS NOTE ADULT - SUBJECTIVE AND OBJECTIVE BOX
SUBJECTIVE:    Pain: Controlled    Complaints: None    MILESTONES:    Alert and Oriented x 3  [ x ]  Out of bed/ ambulating. [ x ]  Flatus:   Positive [ x ]  Negative [  ]  Bowel movement  [  ] Positive [  ] Negative   Voiding [x  ] Due to void [  ]   Stanley/Indwelling catheter in place [  ]  Diet: Regular [ x ]  Clears [  ]  NPO [  ]    Infant feeding:  Breast [  ]   Bottle [  ]  Both [ X ]  Feeding related issues and/or concerns:      OBJECTIVE:  T(C): 36.9 (19 @ 05:49), Max: 36.9 (19 @ 05:49)  HR: 85 (19 @ 05:49) (84 - 85)  BP: 105/62 (19 @ 05:49) (105/62 - 110/72)  RR: 16 (19 @ 05:49) (16 - 16)  SpO2: 99% (19 @ 05:49) (99% - 99%)  Wt(kg): --                        9.7    10.95 )-----------( 221      ( 2019 06:00 )             31.2           Blood Type: O Positive    RPR: Negative          MEDICATIONS  (STANDING):  acetaminophen   Tablet .. 975 milliGRAM(s) Oral <User Schedule>  ascorbic acid 500 milliGRAM(s) Oral daily  ceFAZolin   IVPB 2000 milliGRAM(s) IV Intermittent once  diphtheria/tetanus/pertussis (acellular) Vaccine (ADAcel) 0.5 milliLiter(s) IntraMuscular once  heparin  Injectable 5000 Unit(s) SubCutaneous every 12 hours  ibuprofen  Tablet. 600 milliGRAM(s) Oral every 6 hours  prenatal multivitamin 1 Tablet(s) Oral daily    MEDICATIONS  (PRN):  diphenhydrAMINE 25 milliGRAM(s) Oral every 6 hours PRN Itching  docusate sodium 100 milliGRAM(s) Oral two times a day PRN Stool softening  glycerin Suppository - Adult 1 Suppository(s) Rectal at bedtime PRN Constipation  lanolin Ointment 1 Application(s) Topical every 6 hours PRN Sore Nipples  magnesium hydroxide Suspension 30 milliLiter(s) Oral two times a day PRN Constipation  oxyCODONE    IR 5 milliGRAM(s) Oral every 3 hours PRN Moderate to Severe Pain (4-10)  oxyCODONE    IR 5 milliGRAM(s) Oral once PRN Moderate to Severe Pain (4-10)  simethicone 80 milliGRAM(s) Chew every 4 hours PRN Gas        ASSESSMENT:    39y     G   4   P   1031      PO Day#  2        Delivery: Primary [ X ]    Repeat [  ]       EBL - 700                                  Indication of procedure: Breech, in Labor with Ruptured Membranes    Condition: Stable    Past Medical History significant for: HPI:  Patient is a 38y/o  @ 34 3/7wks gest.  who reports to triage with c/o ruptured membranes since 1800 with mild contractions (2/10 on the pain scale).   Reports good fetal movements.  Pregnancy is a result of IVF.  Patient last ate @ 1500.    Hx. GDM      AP Course - GDMA1      - Short cervix; on progesterone      - vanishing twin @ 5wks gest.      - subchorionic hematoma @ 13 wks.  Meds - pnv & progesterone  NKDA (2019 20:30)      Current Issues:    Breasts:  Soft [x  ]   Engorged [  ]  Nipples:  Abdomen: Soft [ x ]   Distended [  ] Nontender [  ]     Bowel sounds :  Present [  ]  Absent [  ]   Fundus:  Firm [x  ]  Boggy [  ]    Abdominal incision: Clean, Dry and Intact [x  ]  Staples [  ] Steri Strips [  ] Dermabond [ X ] Sutures [  ]    Patient wearing abdominal binder for support.    Vaginal: Lochia:  Heavy [  ]  Moderate [ x ]   Scant [  ]    Extremities: Edema [  ] Negative Louise's Sign [  ] Nontender Cory  [ x ] Positive pedal pulses [  ]    Other relevant physical exam findings:      PLAN:    Plan: Increase ambulation, analgesia PRN and pain medication protocol standing oxycodone, ibuprofen and acetaminophen.    Diet: Regular diet    Continue routine post-operative and postpartum care.     Diabetes - For Repeat Glucose Testing in 6 Weeks.    Discharge Planning [ x ]    For discharge Today  [    ]    Consults:  Social Work [  ]  Lactation [ x ]  Other [         ]

## 2019-07-22 ENCOUNTER — TRANSCRIPTION ENCOUNTER (OUTPATIENT)
Age: 39
End: 2019-07-22

## 2019-07-22 VITALS
HEART RATE: 87 BPM | SYSTOLIC BLOOD PRESSURE: 125 MMHG | TEMPERATURE: 98 F | DIASTOLIC BLOOD PRESSURE: 70 MMHG | OXYGEN SATURATION: 98 % | RESPIRATION RATE: 17 BRPM

## 2019-07-22 RX ORDER — IBUPROFEN 200 MG
1 TABLET ORAL
Qty: 0 | Refills: 0 | DISCHARGE
Start: 2019-07-22

## 2019-07-22 RX ORDER — OXYCODONE HYDROCHLORIDE 5 MG/1
1 TABLET ORAL
Qty: 20 | Refills: 0
Start: 2019-07-22 | End: 2019-07-26

## 2019-07-22 RX ORDER — PROGESTERONE 200 MG/1
0 CAPSULE, LIQUID FILLED ORAL
Qty: 0 | Refills: 0 | DISCHARGE

## 2019-07-22 RX ORDER — ACETAMINOPHEN 500 MG
3 TABLET ORAL
Qty: 0 | Refills: 0 | DISCHARGE
Start: 2019-07-22

## 2019-07-22 RX ORDER — PROGESTERONE 200 MG/1
1 CAPSULE, LIQUID FILLED ORAL
Qty: 0 | Refills: 0 | DISCHARGE

## 2019-07-22 RX ADMIN — HEPARIN SODIUM 5000 UNIT(S): 5000 INJECTION INTRAVENOUS; SUBCUTANEOUS at 06:42

## 2019-07-22 RX ADMIN — Medication 600 MILLIGRAM(S): at 12:56

## 2019-07-22 RX ADMIN — SIMETHICONE 80 MILLIGRAM(S): 80 TABLET, CHEWABLE ORAL at 06:42

## 2019-07-22 RX ADMIN — Medication 1 TABLET(S): at 12:56

## 2019-07-22 RX ADMIN — Medication 600 MILLIGRAM(S): at 07:15

## 2019-07-22 RX ADMIN — Medication 100 MILLIGRAM(S): at 06:42

## 2019-07-22 RX ADMIN — Medication 600 MILLIGRAM(S): at 06:42

## 2019-07-22 RX ADMIN — Medication 600 MILLIGRAM(S): at 13:24

## 2019-07-22 RX ADMIN — Medication 100 MILLIGRAM(S): at 12:57

## 2019-07-22 RX ADMIN — Medication 600 MILLIGRAM(S): at 00:30

## 2019-07-22 NOTE — DISCHARGE NOTE OB - CARE PROVIDER_API CALL
Candy Olivares)  Obstetrics and Gynecology  [address] [place] [address], [facility] [address]  [place] [address]  Phone: [phone]  Fax: [phone]  Follow Up Time:

## 2019-07-22 NOTE — DISCHARGE NOTE OB - PATIENT PORTAL LINK FT
You can access the [facility] Patient Portal, offered by [facility], by registering with the following website: [email or web address]

## 2019-07-22 NOTE — DISCHARGE NOTE OB - HOSPITAL COURSE
s/p c/s for breech/prrom  Patients postoperative course was unremarkable.  She remained hemodynamically stable and afebrile throughout.  The patient met all appropriate post operative milestones.  The patient was able to void, tolerated a regular diet, and ambulated on her own.  The patient was discharged on POD #3 afebrile, with stable vital signs, and appropriate pain control.

## 2019-07-22 NOTE — PROGRESS NOTE ADULT - SUBJECTIVE AND OBJECTIVE BOX
Patient assessed at 1027.  Subjective  Pain: Patient denies any pain at the time of assessment. Pain being managed well by pain management protocol.  Complaints: None. Patient denies any headache, blur vision, dizziness and/or weakness/fatigue  Milestones:  Alert and orientedx3. Out of bed ambulating. Positive flatus. Negative bowel movement, denies the urge. Voiding.  Tolerating a regular diet.  Infant feeding: breastfeeding and using breast pump. Infant in the NICU 34 and 3/7weeks  Feeding related issues and/or concerns: none    Objective  Vital Signs:  Vital Signs Last 24 Hrs  T(C): 36.8 (2019 05:27), Max: 37.1 (2019 22:41)  T(F): 98.3 (2019 05:27), Max: 98.7 (2019 22:41)  HR: 87 (2019 05:27) (79 - 90)  BP: 125/70 (2019 05:27) (107/71 - 125/70)  BP(mean): --  RR: 17 (2019 05:27) (16 - 18)  SpO2: 98% (2019 05:27) (98% - 100%)    Labs:                 9.7    10.95 )-----------( 221      ( 2019 06:00 )             31.2       Blood Type: Opositive  Rubella: Immune  RPR: nonreactive    Medications  MEDICATIONS  (STANDING):  acetaminophen   Tablet .. 975 milliGRAM(s) Oral <User Schedule>  diphtheria/tetanus/pertussis (acellular) Vaccine (ADAcel) 0.5 milliLiter(s) IntraMuscular once  heparin  Injectable 5000 Unit(s) SubCutaneous every 12 hours  ibuprofen  Tablet. 600 milliGRAM(s) Oral every 6 hours  prenatal multivitamin 1 Tablet(s) Oral daily    MEDICATIONS  (PRN):  diphenhydrAMINE 25 milliGRAM(s) Oral every 6 hours PRN Itching  docusate sodium 100 milliGRAM(s) Oral two times a day PRN Stool softening  glycerin Suppository - Adult 1 Suppository(s) Rectal at bedtime PRN Constipation  lanolin Ointment 1 Application(s) Topical every 6 hours PRN Sore Nipples  magnesium hydroxide Suspension 30 milliLiter(s) Oral two times a day PRN Constipation  oxyCODONE    IR 5 milliGRAM(s) Oral every 3 hours PRN Moderate to Severe Pain (4-10)  oxyCODONE    IR 5 milliGRAM(s) Oral once PRN Moderate to Severe Pain (4-10)  simethicone 80 milliGRAM(s) Chew every 4 hours PRN Gas    Assessment  40y/o     Day#3    primary post-operative  section delivery for breech and PPROM. 34 and 3/7weeks                                      Condition: Stable  Past Medical History significant for: gestational diabetes diet controlled, IVF, SABx2 s/p IVF cycles, vanishing twin @ 5weeks gestation, uterine poly 2013, termination of pregnancy 2016 for trisomy 21  Current Issues: EBL 900cc  Lung sounds clear bilaterally.  Breasts: engorged, nontender  Nipples: intact  Abdomen: Soft, nondistended and nontender. Bowel sounds present. Fundus firm  Abdominal incision: Clean, dry and intact with dermabond.   Vaginal: Lochia light rubra  Extremities: Slight edema noted bilaterally and equal to lower extremities, nontender with no erythremic areas noted. Positive pedal pulses  Other relevant physical exam findings: none    Plan  Continue routine post-operative and postpartum care  Increase ambulation, analgesia PRN and pain medication protocol of standing ibuprofen and acetaminophen and oxycodone prn  Encouraged to wear abdominal binder for support and to use incentive spirometer  Discussed breast/nipple/engorgement care for a breastfeeding mother and use of breast pump due to  infant in the NICU.   Glucose testing needed in 6weeks for history of gestational diabetes.   Discussed abdominal incision care.   Discharge to home today. Discussed discharge planning.

## 2019-07-28 LAB — SURGICAL PATHOLOGY STUDY: SIGNIFICANT CHANGE UP

## 2021-02-19 NOTE — DISCHARGE NOTE OB - MEDICATION SUMMARY - MEDICATIONS TO TAKE
Pharmacy Note - Renal Dosing    Gabapentin 400 mg BID ordered for patient. Patient takes gabapentin 600 mg BID at home. This medication is renally eliminated, ALIS is resolving, and CrCl is now >50 mL/min. Will change back to patient's home dose of gabapentin 600mg BID per renal dose adjustment policy. Estimated Creatinine Clearance: 51 mL/min (A) (based on SCr of 1.4 mg/dL (H)). Pharmacy will continue to monitor renal function and adjust dose as necessary. Please call with any questions.     Dina Felipe, PharmD, [address] [place] [address] Pharmacy: [phone] I will START or STAY ON the medications listed below when I get home from the hospital:    ibuprofen 600 mg oral tablet  -- 1 tab(s) by mouth every 6 hours, As Needed  -- Indication: For Pain    acetaminophen 325 mg oral tablet  -- 3 tab(s) by mouth , As Needed  -- Indication: For Pain    oxyCODONE 5 mg oral capsule  -- 1 cap(s) by mouth every 6 hours MDD:4  -- Caution federal law prohibits the transfer of this drug to any person other  than the person for whom it was prescribed.  It is very important that you take or use this exactly as directed.  Do not skip doses or discontinue unless directed by your doctor.  May cause drowsiness.  Alcohol may intensify this effect.  Use care when operating dangerous machinery.  This prescription cannot be refilled.  Using more of this medication than prescribed may cause serious breathing problems.    -- Indication: For Pain

## 2021-06-19 ENCOUNTER — RESULT REVIEW (OUTPATIENT)
Age: 41
End: 2021-06-19

## 2021-06-19 ENCOUNTER — INPATIENT (INPATIENT)
Facility: HOSPITAL | Age: 41
LOS: 1 days | Discharge: HOME | End: 2021-06-21
Attending: OBSTETRICS & GYNECOLOGY | Admitting: OBSTETRICS & GYNECOLOGY
Payer: COMMERCIAL

## 2021-06-19 VITALS — SYSTOLIC BLOOD PRESSURE: 136 MMHG | HEART RATE: 75 BPM | DIASTOLIC BLOOD PRESSURE: 76 MMHG

## 2021-06-19 DIAGNOSIS — Z33.2 ENCOUNTER FOR ELECTIVE TERMINATION OF PREGNANCY: Chronic | ICD-10-CM

## 2021-06-19 DIAGNOSIS — O03.9 COMPLETE OR UNSPECIFIED SPONTANEOUS ABORTION WITHOUT COMPLICATION: Chronic | ICD-10-CM

## 2021-06-19 DIAGNOSIS — Z3A.00 WEEKS OF GESTATION OF PREGNANCY NOT SPECIFIED: ICD-10-CM

## 2021-06-19 DIAGNOSIS — O26.899 OTHER SPECIFIED PREGNANCY RELATED CONDITIONS, UNSPECIFIED TRIMESTER: ICD-10-CM

## 2021-06-19 LAB
B PERT DNA SPEC QL NAA+PROBE: SIGNIFICANT CHANGE UP
BASOPHILS # BLD AUTO: 0.04 K/UL — SIGNIFICANT CHANGE UP (ref 0–0.2)
BASOPHILS NFR BLD AUTO: 0.5 % — SIGNIFICANT CHANGE UP (ref 0–2)
C PNEUM DNA SPEC QL NAA+PROBE: SIGNIFICANT CHANGE UP
COVID-19 SPIKE DOMAIN AB INTERP: POSITIVE
COVID-19 SPIKE DOMAIN ANTIBODY RESULT: >250 U/ML — HIGH
EOSINOPHIL # BLD AUTO: 0.12 K/UL — SIGNIFICANT CHANGE UP (ref 0–0.5)
EOSINOPHIL NFR BLD AUTO: 1.4 % — SIGNIFICANT CHANGE UP (ref 0–6)
FLUAV SUBTYP SPEC NAA+PROBE: SIGNIFICANT CHANGE UP
FLUBV RNA SPEC QL NAA+PROBE: SIGNIFICANT CHANGE UP
HADV DNA SPEC QL NAA+PROBE: SIGNIFICANT CHANGE UP
HCOV 229E RNA SPEC QL NAA+PROBE: SIGNIFICANT CHANGE UP
HCOV HKU1 RNA SPEC QL NAA+PROBE: SIGNIFICANT CHANGE UP
HCOV NL63 RNA SPEC QL NAA+PROBE: SIGNIFICANT CHANGE UP
HCOV OC43 RNA SPEC QL NAA+PROBE: SIGNIFICANT CHANGE UP
HCT VFR BLD CALC: 33.3 % — LOW (ref 34.5–45)
HCT VFR BLD CALC: 37 % — SIGNIFICANT CHANGE UP (ref 34.5–45)
HGB BLD-MCNC: 10.3 G/DL — LOW (ref 11.5–15.5)
HGB BLD-MCNC: 11.8 G/DL — SIGNIFICANT CHANGE UP (ref 11.5–15.5)
HMPV RNA SPEC QL NAA+PROBE: SIGNIFICANT CHANGE UP
HPIV1 RNA SPEC QL NAA+PROBE: SIGNIFICANT CHANGE UP
HPIV2 RNA SPEC QL NAA+PROBE: SIGNIFICANT CHANGE UP
HPIV3 RNA SPEC QL NAA+PROBE: SIGNIFICANT CHANGE UP
HPIV4 RNA SPEC QL NAA+PROBE: SIGNIFICANT CHANGE UP
IANC: 5.53 K/UL — SIGNIFICANT CHANGE UP (ref 1.5–8.5)
IMM GRANULOCYTES NFR BLD AUTO: 0.8 % — SIGNIFICANT CHANGE UP (ref 0–1.5)
LYMPHOCYTES # BLD AUTO: 2.14 K/UL — SIGNIFICANT CHANGE UP (ref 1–3.3)
LYMPHOCYTES # BLD AUTO: 25.6 % — SIGNIFICANT CHANGE UP (ref 13–44)
MCHC RBC-ENTMCNC: 26.9 PG — LOW (ref 27–34)
MCHC RBC-ENTMCNC: 27 PG — SIGNIFICANT CHANGE UP (ref 27–34)
MCHC RBC-ENTMCNC: 30.9 GM/DL — LOW (ref 32–36)
MCHC RBC-ENTMCNC: 31.9 GM/DL — LOW (ref 32–36)
MCV RBC AUTO: 84.5 FL — SIGNIFICANT CHANGE UP (ref 80–100)
MCV RBC AUTO: 87.2 FL — SIGNIFICANT CHANGE UP (ref 80–100)
MONOCYTES # BLD AUTO: 0.47 K/UL — SIGNIFICANT CHANGE UP (ref 0–0.9)
MONOCYTES NFR BLD AUTO: 5.6 % — SIGNIFICANT CHANGE UP (ref 2–14)
NEUTROPHILS # BLD AUTO: 5.53 K/UL — SIGNIFICANT CHANGE UP (ref 1.8–7.4)
NEUTROPHILS NFR BLD AUTO: 66.1 % — SIGNIFICANT CHANGE UP (ref 43–77)
NRBC # BLD: 0 /100 WBCS — SIGNIFICANT CHANGE UP
NRBC # BLD: 0 /100 WBCS — SIGNIFICANT CHANGE UP
NRBC # FLD: 0 K/UL — SIGNIFICANT CHANGE UP
NRBC # FLD: 0 K/UL — SIGNIFICANT CHANGE UP
PLATELET # BLD AUTO: 193 K/UL — SIGNIFICANT CHANGE UP (ref 150–400)
PLATELET # BLD AUTO: 231 K/UL — SIGNIFICANT CHANGE UP (ref 150–400)
RAPID RVP RESULT: SIGNIFICANT CHANGE UP
RBC # BLD: 3.82 M/UL — SIGNIFICANT CHANGE UP (ref 3.8–5.2)
RBC # BLD: 4.38 M/UL — SIGNIFICANT CHANGE UP (ref 3.8–5.2)
RBC # FLD: 14.6 % — HIGH (ref 10.3–14.5)
RBC # FLD: 14.6 % — HIGH (ref 10.3–14.5)
RH IG SCN BLD-IMP: POSITIVE — SIGNIFICANT CHANGE UP
RSV RNA SPEC QL NAA+PROBE: SIGNIFICANT CHANGE UP
RV+EV RNA SPEC QL NAA+PROBE: SIGNIFICANT CHANGE UP
SARS-COV-2 IGG+IGM SERPL QL IA: >250 U/ML — HIGH
SARS-COV-2 IGG+IGM SERPL QL IA: POSITIVE
SARS-COV-2 RNA SPEC QL NAA+PROBE: SIGNIFICANT CHANGE UP
T PALLIDUM AB TITR SER: NEGATIVE — SIGNIFICANT CHANGE UP
WBC # BLD: 11.19 K/UL — HIGH (ref 3.8–10.5)
WBC # BLD: 8.37 K/UL — SIGNIFICANT CHANGE UP (ref 3.8–10.5)
WBC # FLD AUTO: 11.19 K/UL — HIGH (ref 3.8–10.5)
WBC # FLD AUTO: 8.37 K/UL — SIGNIFICANT CHANGE UP (ref 3.8–10.5)

## 2021-06-19 PROCEDURE — 88307 TISSUE EXAM BY PATHOLOGIST: CPT | Mod: 26

## 2021-06-19 RX ORDER — AMPICILLIN TRIHYDRATE 250 MG
2 CAPSULE ORAL ONCE
Refills: 0 | Status: COMPLETED | OUTPATIENT
Start: 2021-06-19 | End: 2021-06-19

## 2021-06-19 RX ORDER — CITRIC ACID/SODIUM CITRATE 300-500 MG
30 SOLUTION, ORAL ORAL ONCE
Refills: 0 | Status: DISCONTINUED | OUTPATIENT
Start: 2021-06-19 | End: 2021-06-19

## 2021-06-19 RX ORDER — IBUPROFEN 200 MG
600 TABLET ORAL EVERY 6 HOURS
Refills: 0 | Status: COMPLETED | OUTPATIENT
Start: 2021-06-19 | End: 2022-05-18

## 2021-06-19 RX ORDER — AMPICILLIN TRIHYDRATE 250 MG
1 CAPSULE ORAL EVERY 4 HOURS
Refills: 0 | Status: DISCONTINUED | OUTPATIENT
Start: 2021-06-19 | End: 2021-06-19

## 2021-06-19 RX ORDER — KETOROLAC TROMETHAMINE 30 MG/ML
30 SYRINGE (ML) INJECTION ONCE
Refills: 0 | Status: DISCONTINUED | OUTPATIENT
Start: 2021-06-19 | End: 2021-06-19

## 2021-06-19 RX ORDER — FAMOTIDINE 10 MG/ML
20 INJECTION INTRAVENOUS ONCE
Refills: 0 | Status: COMPLETED | OUTPATIENT
Start: 2021-06-19 | End: 2021-06-19

## 2021-06-19 RX ORDER — SODIUM CHLORIDE 9 MG/ML
1000 INJECTION, SOLUTION INTRAVENOUS
Refills: 0 | Status: DISCONTINUED | OUTPATIENT
Start: 2021-06-19 | End: 2021-06-19

## 2021-06-19 RX ORDER — SODIUM CHLORIDE 9 MG/ML
1000 INJECTION, SOLUTION INTRAVENOUS ONCE
Refills: 0 | Status: DISCONTINUED | OUTPATIENT
Start: 2021-06-19 | End: 2021-06-19

## 2021-06-19 RX ORDER — PROGESTERONE 200 MG/1
1 CAPSULE, LIQUID FILLED ORAL
Qty: 0 | Refills: 0 | DISCHARGE

## 2021-06-19 RX ADMIN — FAMOTIDINE 20 MILLIGRAM(S): 10 INJECTION INTRAVENOUS at 05:15

## 2021-06-19 RX ADMIN — Medication 216 GRAM(S): at 05:15

## 2021-06-19 RX ADMIN — Medication 30 MILLIGRAM(S): at 11:19

## 2021-06-19 RX ADMIN — Medication 108 GRAM(S): at 09:05

## 2021-06-19 RX ADMIN — Medication 30 MILLIGRAM(S): at 12:29

## 2021-06-19 NOTE — OB RN DELIVERY SUMMARY - NS_SEPSISRSKCALC_OBGYN_ALL_OB_FT
Rupture of membranes must be entered above.   EOS calculated successfully. EOS Risk Factor: 0.5/1000 live births ([facility] national incidence); GA=37w4d; Temp=98.06; ROM=6.483; GBS='Positive'; Antibiotics='GBS specific antibiotics > 2 hrs prior to birth'

## 2021-06-19 NOTE — OB RN TRIAGE NOTE - INTERNATIONAL TRAVEL
OPERATIVE REPORT  PATIENT NAME: Erika Mendez  :  1958  MRN: [id number]  Pt Location: [facility]    SURGERY DATE: 19    Surgeon(s) and Role:     * Cinthia Rodas MD - Primary     * Yasmin Ferrer - Assisting    Pre-Op Diagnosis:  Marti Michaelot tenosynovitis, right [M65 4]    Post-Op Diagnosis Codes:     * De Quervain's tenosynovitis, right [M65 4]     * Ganglion cyst [M67 40]    Procedure(s):  RELEASE DEQUERVAINS (Right)  EXCISION GANGLION CYST RIGHT FIRST DORSAL EXTENSOR COMPARTMENT (Right)    Specimen(s):  * No orders in the log *    Estimated Blood Loss:   Minimal      Anesthesia Type:   Regional with Sedation    Operative Indications: The patient has a history of de Quervain stenosis tenosynovitis  right and Dorsal ganglion cyst  right that was recalcitrant to conservative management  The decision was made to bring the patient to the operating room for de Quervain release  right and Dorsal ganglion cyst excision  right  Risks of the procedure were explained which include, but are not limited to bleeding; infection; damage to nerves, arteries,veins, tendons; scar; pain; need for reoperation; failure to give desired result; and risks of anaesthesia  All questions were answered to satisfaction and they were willing to proceed  Operative Findings:  Geoff Loida stenosing tenosynovitis  Ganglion cyst 3 mm x 3 mm off the volar aspect of the 1st dorsal extensor compartment sheath    Complications:   None    Procedure and Technique:  After the patient, site, and procedure were identified, the patient was brought into the operating room in a supine position  Local anaesthesia and sedation were provided  A well padded tourniquet was applied to the extremity, set at 250 mmHg  The  right upper extremity was then prepped and drapped in a normal, sterile, orthopedic fashion      After the patient, site, and procedure were once again identified, attention was turned to the right wrist   A longitudinal incision was made over the first dorsal extensor compartment  Dissection was carried out inline with the extensor tendons  Care was taken to protect the superficial neurovascular structures including the branches of the superficial sensory branch of the radial nerve  The tendon sheath was identified  There was a ganglion cyst measuring 3 mm x 3 mm off the volar aspect of the 1st dorsal extensor compartment  The entirety of the 1st dorsal extensor compartment was identified  and was divided inline with the tendons under direct visualization in its entirety  The extensor pollicis brevis was inspected to ensure complete release from any subcompartment  No sub compartment was noticed  The abductor pollicis longus was also evaluated  The thumb was brought through a full range of motion to ensure complete release without any binding, catching, popping, subluxation, clicking, or locking  The volar ganglion cyst was then excised with a small cuff of tissue to prevent recurrence from the volar leaflet  A loose stay suture was placed within the leaflets of the extensor tendon sheath to prevent tendon subluxation  The tourniquet was then deflated  At the completion of the procedure, hemostasis was obtained with cautery and direct pressure  The wounds were copiously irrigated with sterile solution  The wounds were closed with Vicryl, Monocryl, Histocryl and Steri-strips  Sterile dressings were applied, including Gauze and Tegaderm  Please note, all sponge, needle, and instrument counts were correct prior to closure  Loupe magnification was utilized  The patient tolerated the procedure well       I was present for the entire procedure    Patient Disposition:  PACU  and hemodynamically stable    SIGNATURE: Kymberly Kirkpatrick MD  DATE: 02/19/19  TIME: 9:58 AM No

## 2021-06-19 NOTE — OB PROVIDER TRIAGE NOTE - HISTORY OF PRESENT ILLNESS
42 y/o pt  39.2 weeks presents to triage with c/o rupture of membranes @ 0345, clear odorless fluid and painful contractions every 2-3 minutes. pt denies any bleeding, n/v/d, fever or chills. pt endorses +fetal movement  Patient desires repeat , NPO @ 1900  AP complicated by:  -GDM A1  -hx of short cervix; pt on vaginal progesterone   -24 hour protein elevated , repeat WNL     NKDA  PMH: denies  PSH:   Laparoscopic polypectomy 13'  Laparoscopic Ovarian cyst removed 14'  Primary  19'  OB:  Primary  breech/PPROM 2019 @34 weeks, short cervix/GDM A1/vanishing twin  SAB 19' vanishing twin  SAB 18' @ 3 weeks, complete  SAB 16'- Trisomy 21  GYN:  Fibroid, ovarian cyst, uterine polyp  Social hx: denies  Medications:  PNV  Vaginal progesterone  42 y/o pt  37.4 weeks presents to triage with c/o rupture of membranes @ 0345, clear odorless fluid and painful contractions every 2-3 minutes. pt denies any bleeding, n/v/d, fever or chills. pt endorses +fetal movement  Patient desires repeat , NPO @ 1900  AP complicated by:  -GDM A1  -hx of short cervix; pt on vaginal progesterone   -24 hour protein elevated , repeat WNL     NKDA  PMH: denies  PSH:   Laparoscopic polypectomy 13'  Laparoscopic Ovarian cyst removed 14'  Primary  19'  OB:  Primary  breech/PPROM 2019 @34 weeks, short cervix/GDM A1/vanishing twin  SAB 19' vanishing twin  SAB 18' @ 3 weeks, complete  SAB 16'- Trisomy 21  GYN:  Fibroid, ovarian cyst, uterine polyp  Social hx: denies  Medications:  PNV  Vaginal progesterone

## 2021-06-19 NOTE — OB PROVIDER DELIVERY SUMMARY - NSPROVIDERDELIVERYNOTE_OBGYN_ALL_OB_FT
Attending Note     Pt progressed to 10/100/+2   Pushed and delivered vertex over intact perineum followed by the body and shoulders  delayed cord clamping performed   Placenta delivered intact   thin ANGELI noted on bimanual exam with empty uterus  vagina, rectum and cervix inspected  2nd degree laceration noted and repaired in usual fashion   rectal exam intact after delivery   BSUS thin endometrial stripe, thin ANGELI, intact no evidence of rupture, no free fluid      Girl   Will plan for cbc at hour and monitor vs/pain closely     MARKUS Weaver MD

## 2021-06-19 NOTE — OB PROVIDER H&P - ASSESSMENT
42 y/o pt 37.4 weeks  presents with SROM for repeat   d/w Dr Dash  Plan:  -Admit l&d. Routine labs  -Repeat   -NPO   -Fetus: cat 1 tracing, vertex presentation, continuous monitoring  -Ampicillin for GBS prophylaxis  -2units PRBC on hold   -Rapid covid 19 pending for patient, support person received vaccine series  -Consents signed and witnessed at bedside

## 2021-06-19 NOTE — OB PROVIDER TRIAGE NOTE - NS_OBGYNHISTORY_OBGYN_ALL_OB_FT
OB:  Primary  breech/PPROM 2019 @34 weeks, short cervix/GDM A1/vanishing twin  SAB 19' vanishing twin  SAB 18' @ 3 weeks, complete  SAB 16'- Trisomy 21  GYN:  Fibroid, ovarian cyst, uterine polyp

## 2021-06-19 NOTE — CHART NOTE - NSCHARTNOTEFT_GEN_A_CORE
Attending Note     Pt seen for rectal pressure   SVE AL/100/+1  FHTs 125 mod rob no decels + accels   TOCO q 2min  Discussed with pt risks/benefits of TOLAC vs rLTCS  PT desires TOLAC, transferred to LDR   Epidural now     R Owen PALMA

## 2021-06-19 NOTE — CHART NOTE - NSCHARTNOTEFT_GEN_A_CORE
Attending Note     PT feeling pressure     AFVSS  GEn in NAD   ABd soft, gravid  Ext: no c/c/e     SVE 10/100/0   FHTS 145 mod rob no decels no accels   TOCO Q 2-3 min     TOLAC in 2nd stage  Will allow for passive descent  begin pushing in 30 minutes       R Owen PALMA

## 2021-06-19 NOTE — OB PROVIDER TRIAGE NOTE - NSOBPROVIDERNOTE_OBGYN_ALL_OB_FT
42 y/o pt 39.2 weeks  presents with SROM for repeat   d/w Dr Dash  Plan:  -Admit l&d. Routine labs  -Repeat   -NPO   -Fetus: cat 1 tracing, vertex presentation, continuous monitoring  -Ampicillin for GBS prophylaxis  -Rapid covid 19 pending for patient, support person received vaccine series  -Consents signed and witnessed at bedside 42 y/o pt 37.4 weeks  presents with SROM for repeat   d/w Dr Dash  Plan:  -Admit l&d. Routine labs  -Repeat   -NPO   -Fetus: cat 1 tracing, vertex presentation, continuous monitoring  -Ampicillin for GBS prophylaxis  -Rapid covid 19 pending for patient, support person received vaccine series  -Consents signed and witnessed at bedside

## 2021-06-19 NOTE — OB NEONATOLOGY/PEDIATRICIAN DELIVERY SUMMARY - NSPEDSNEONOTESA_OBGYN_ALL_OB_FT
Baby ÁLVARO is a 37.4 wk GA female born to a 42 y/o  mother via . Maternal history uncomplicated. Prenatal history complicated by GDMA1 (diet controlled). Maternal BT O+. PNL neg, NR, and immune. GBS positive, treated with amp x2 >2hr PTD. SROM at 03:45, 6.5 hr PTD with clear fluids. Labor complicated by Cat II FHT. Delivery uncomplicated. Baby born vigorous and crying spontaneously. WDSS. Apgars 9/9. EOS 0.05 (MT 36.6). Mom plans to breastfeed and consents to HepB vaccine. Mother is COVID negative and vaccinated. BW 3340 - AGA.

## 2021-06-19 NOTE — CHART NOTE - NSCHARTNOTEFT_GEN_A_CORE
OBGYN Covering Attending    Pt seen and examined for cervical change.  6cm/90/-3.  Pt still requesting repeat  section.  Will proceed once type and screen results.    Thomas Allen MD

## 2021-06-19 NOTE — OB PROVIDER H&P - PROBLEM SELECTOR PLAN 1
-Admit l&d. Routine labs  -Repeat   -NPO   -Fetus: cat 1 tracing, vertex presentation, continuous monitoring  -Ampicillin for GBS prophylaxis  -2units PRBC on hold   -Rapid covid 19 pending for patient, support person received vaccine series  -Consents signed and witnessed at bedside

## 2021-06-19 NOTE — CHART NOTE - NSCHARTNOTEFT_GEN_A_CORE
Attending Note     Pt feeling well, no pain   eating lunch   not been able to urinate   Josue placed with 1 L draining   hgb 10  abd soft, nontender  Will transfer to pp   josue to remain in place overnight    MARKUS Weaver MD

## 2021-06-19 NOTE — OB PROVIDER TRIAGE NOTE - NSHPPHYSICALEXAM_GEN_ALL_CORE
Vital Signs Last 24 Hrs  T(C): 36.6 (19 Jun 2021 05:04), Max: 36.6 (19 Jun 2021 04:37)  T(F): 97.9 (19 Jun 2021 05:04), Max: 97.9 (19 Jun 2021 04:37)  HR: 85 (19 Jun 2021 05:04) (75 - 85)  BP: 140/74 (19 Jun 2021 05:04) (136/76 - 140/74)  BP(mean): --  RR: 18 (19 Jun 2021 05:04) (18 - 18)  SpO2: --    Abdomen: soft, non tender. no guarding or rebound tenderness  SSE:  +pooling  +nitrazine  +ferning  SVE: 5/90/-2  TAS: vertex presentation  EFW 3175gm by Leopold's     NST in progress

## 2021-06-19 NOTE — OB RN DELIVERY SUMMARY - NSSELHIDDEN_OBGYN_ALL_OB_FT
[[email or web address]_ALL_OB_FT:[email or web address]],[NS_DeliveryRN_OBGYN_ALL_OB_FT:[email or web address]]

## 2021-06-20 ENCOUNTER — TRANSCRIPTION ENCOUNTER (OUTPATIENT)
Age: 41
End: 2021-06-20

## 2021-06-20 LAB
HCT VFR BLD CALC: 27.1 % — LOW (ref 34.5–45)
HCT VFR BLD CALC: 28.1 % — LOW (ref 34.5–45)
HGB BLD-MCNC: 8.7 G/DL — LOW (ref 11.5–15.5)
HGB BLD-MCNC: 9 G/DL — LOW (ref 11.5–15.5)
MCHC RBC-ENTMCNC: 26.9 PG — LOW (ref 27–34)
MCHC RBC-ENTMCNC: 27.4 PG — SIGNIFICANT CHANGE UP (ref 27–34)
MCHC RBC-ENTMCNC: 32 GM/DL — SIGNIFICANT CHANGE UP (ref 32–36)
MCHC RBC-ENTMCNC: 32.1 GM/DL — SIGNIFICANT CHANGE UP (ref 32–36)
MCV RBC AUTO: 84.1 FL — SIGNIFICANT CHANGE UP (ref 80–100)
MCV RBC AUTO: 85.5 FL — SIGNIFICANT CHANGE UP (ref 80–100)
NRBC # BLD: 0 /100 WBCS — SIGNIFICANT CHANGE UP
NRBC # BLD: 0 /100 WBCS — SIGNIFICANT CHANGE UP
NRBC # FLD: 0 K/UL — SIGNIFICANT CHANGE UP
NRBC # FLD: 0 K/UL — SIGNIFICANT CHANGE UP
PLATELET # BLD AUTO: 194 K/UL — SIGNIFICANT CHANGE UP (ref 150–400)
PLATELET # BLD AUTO: 207 K/UL — SIGNIFICANT CHANGE UP (ref 150–400)
RBC # BLD: 3.17 M/UL — LOW (ref 3.8–5.2)
RBC # BLD: 3.34 M/UL — LOW (ref 3.8–5.2)
RBC # FLD: 14.7 % — HIGH (ref 10.3–14.5)
RBC # FLD: 14.8 % — HIGH (ref 10.3–14.5)
WBC # BLD: 10.46 K/UL — SIGNIFICANT CHANGE UP (ref 3.8–10.5)
WBC # BLD: 9.02 K/UL — SIGNIFICANT CHANGE UP (ref 3.8–10.5)
WBC # FLD AUTO: 10.46 K/UL — SIGNIFICANT CHANGE UP (ref 3.8–10.5)
WBC # FLD AUTO: 9.02 K/UL — SIGNIFICANT CHANGE UP (ref 3.8–10.5)

## 2021-06-20 RX ORDER — ACETAMINOPHEN 500 MG
3 TABLET ORAL
Qty: 0 | Refills: 0 | DISCHARGE
Start: 2021-06-20

## 2021-06-20 RX ORDER — IBUPROFEN 200 MG
1 TABLET ORAL
Qty: 0 | Refills: 0 | DISCHARGE
Start: 2021-06-20

## 2021-06-20 NOTE — DISCHARGE NOTE OB - PLAN OF CARE
return to normal health nothing in the vagina x 6 weeks Make your follow-up appointment with your doctor as ordered. Call your doctor to schedule your post-partum follow up appointment in 4-6 weeks. No heavy lifting, driving, or strenuous activity for 6 weeks. Nothing per vagina such as tampons, intercourse, douches or tub baths for 6 weeks or until you see your doctor. Call your doctor with any signs and symptoms of infection such as fever, chills, nausea or vomiting. Call your doctor if you’re unable to tolerate food, increase in vaginal bleeding, or have difficulty urinating. Call your doctor if you have pain that is not relieved by your prescribed medications. Notify your doctor with any other concerns. Call your doctor if you have any of these concerns in the next 6 weeks.

## 2021-06-20 NOTE — PROGRESS NOTE ADULT - ATTENDING COMMENTS
Attending Note   pt reports feeling well   no pain   voided since josue removed  minimal bleeding   abd soft, fundus firm nontender  Perineum healing well   hgb trended down this AM, most likely equilibration, will repeat cbc at noon   if stable, will d/c home if baby is cleared    MARKUS Demarco MD

## 2021-06-20 NOTE — DISCHARGE NOTE OB - MEDICATION SUMMARY - MEDICATIONS TO TAKE
I will START or STAY ON the medications listed below when I get home from the hospital:    acetaminophen 325 mg oral tablet  -- 3 tab(s) by mouth   -- Indication: For for pain    ibuprofen 600 mg oral tablet  -- 1 tab(s) by mouth every 6 hours  -- Indication: For for pain

## 2021-06-20 NOTE — DISCHARGE NOTE OB - CARE PLAN
Principal Discharge DX:	, delivered  Goal:	return to normal health  Assessment and plan of treatment:	nothing in the vagina x 6 weeks   Principal Discharge DX:	, delivered  Goal:	return to normal health  Assessment and plan of treatment:	Make your follow-up appointment with your doctor as ordered. Call your doctor to schedule your post-partum follow up appointment in 4-6 weeks. No heavy lifting, driving, or strenuous activity for 6 weeks. Nothing per vagina such as tampons, intercourse, douches or tub baths for 6 weeks or until you see your doctor. Call your doctor with any signs and symptoms of infection such as fever, chills, nausea or vomiting. Call your doctor if you’re unable to tolerate food, increase in vaginal bleeding, or have difficulty urinating. Call your doctor if you have pain that is not relieved by your prescribed medications. Notify your doctor with any other concerns. Call your doctor if you have any of these concerns in the next 6 weeks.

## 2021-06-20 NOTE — DISCHARGE NOTE OB - CARE PROVIDER_API CALL
Candy Olivares  OBSTETRICS AND GYNECOLOGY  [address] [place] [address] [address]  [place] [address]  Phone: [phone]  Fax: [phone]  Follow Up Time:

## 2021-06-20 NOTE — DISCHARGE NOTE OB - MEDICATION SUMMARY - MEDICATIONS TO STOP TAKING
I will STOP taking the medications listed below when I get home from the hospital:    progesterone 200 mg vaginal suppository  -- 1 suppository(ies) vaginally 2 times a day

## 2021-06-20 NOTE — DISCHARGE NOTE OB - HOSPITAL COURSE
She presented in active labor and had uncomplicated vaginal delivery. Thin Lower uterine segement noted after delivery with no evidence of rupture on exam or ultrasound  36 y/o  presented in active labor and had uncomplicated . Thin Lower uterine segment noted after delivery with no evidence of rupture on exam or ultrasound. Postpartum course complicated by urinary retention on PPD#1, resolved.  On postpartum day 2, patient was discharged home in stable condition, voiding spontaneously and with normal vital signs.

## 2021-06-20 NOTE — DISCHARGE NOTE OB - PATIENT PORTAL LINK FT
You can access the FollowMyHealth Patient Portal offered by [facility] by registering at the following website: [email or web address]. By joining [facility]’s FollowMyHealth portal, you will also be able to view your health information using other applications (apps) compatible with our system.

## 2021-06-21 VITALS
RESPIRATION RATE: 20 BRPM | TEMPERATURE: 98 F | SYSTOLIC BLOOD PRESSURE: 124 MMHG | OXYGEN SATURATION: 98 % | HEART RATE: 76 BPM | DIASTOLIC BLOOD PRESSURE: 72 MMHG

## 2021-06-21 NOTE — PROGRESS NOTE ADULT - ATTENDING COMMENTS
Associate Chief of L & D (Late entry)     I have not met this patient before today.  She received her care with Dr Kurt Norton.  She was admitted by Dr Dash for repeat c section and progressed in labor and had a successful  with Dr Shai Jesus    OB Progress Note:  PPD#2    S: 42yo  PPD#2 s/p . Patient denies any complaints    O:  Vitals:  Vital Signs Last 24 Hrs  T(C): 36.9 (2021 05:46), Max: 36.9 (2021 05:46)  T(F): 98.5 (2021 05:46), Max: 98.5 (2021 05:46)  HR: 70 (2021 05:46) (70 - 91)  BP: 130/73 (2021 05:46) (121/74 - 130/73)  BP(mean): 83 (2021 05:46) (83 - 83)  RR: 18 (2021 05:46) (17 - 18)  SpO2: 99% (2021 05:46) (99% - 99%)    MEDICATIONS  (STANDING):  acetaminophen   Tablet .. 975 milliGRAM(s) Oral <User Schedule>  dextrose 5% + lactated ringers. 1000 milliLiter(s) (125 mL/Hr) IV Continuous <Continuous>  diphtheria/tetanus/pertussis (acellular) Vaccine (ADAcel) 0.5 milliLiter(s) IntraMuscular once  ibuprofen  Tablet. 600 milliGRAM(s) Oral every 6 hours  oxytocin Infusion 333.333 milliUNIT(s)/Min (1000 mL/Hr) IV Continuous <Continuous>  oxytocin Infusion 333.333 milliUNIT(s)/Min (1000 mL/Hr) IV Continuous <Continuous>  prenatal multivitamin 1 Tablet(s) Oral daily  sodium chloride 0.9% lock flush 3 milliLiter(s) IV Push every 8 hours      Labs:  Blood type: O Positive  Rubella IgG: RPR: Negative                          9.0<L>   9.02 >-----------< 207    (  @ 13:38 )             28.1<L>                        8.7<L>   10.46 >-----------< 194    (  @ 07:08 )             27.1<L>                        10.3<L>   11.19<H> >-----------< 193    (  @ 14:46 )             33.3<L>                        11.8   8.37 >-----------< 231    (  @ 05:14 )             37.0            Physical Exam:    Abdomen: soft, fundus firm,  Vaginal: Lochia scant, suture in situ  Extremities: No erythema/ trace edema    A/P: 42yo PPD#2 s/p  and repair of 2nd degree laceration  - Patient is stable for discharge and will follow up with Dr Kurt King M.D., M.B.A., M.S.

## 2021-06-21 NOTE — PROGRESS NOTE ADULT - SUBJECTIVE AND OBJECTIVE BOX
Patient seen and examined at bedside, no acute overnight events. No acute complaints, pain well controlled. Patient is ambulating, voiding spontaneously, passing gas, and tolerating regular diet. Denies CP, SOB, N/V, HA, blurred vision, epigastric pain.    Vital Signs Last 24 Hours  T(C): 36.6 (06-20-21 @ 05:26), Max: 36.9 (06-19-21 @ 18:48)  HR: 75 (06-19-21 @ 22:49) (47 - 116)  BP: 108/- (06-20-21 @ 05:26) (103/73 - 120/68)  RR: 18 (06-20-21 @ 05:26) (16 - 18)  SpO2: 100% (06-20-21 @ 05:26) (87% - 100%)    Physical Exam:  General: NAD  Abdomen: Soft, non-tender, non-distended, fundus firm  Pelvic: Lochia wnl    Labs:    Blood Type: O Positive  Antibody Screen: Negative  RPR: Negative               8.7    10.46 )-----------( 194      ( 06-20 @ 07:08 )             27.1                10.3   11.19 )-----------( 193      ( 06-19 @ 14:46 )             33.3                11.8   8.37  )-----------( 231      ( 06-19 @ 05:14 )             37.0         MEDICATIONS  (STANDING):  acetaminophen   Tablet .. 975 milliGRAM(s) Oral <User Schedule>  dextrose 5% + lactated ringers. 1000 milliLiter(s) (125 mL/Hr) IV Continuous <Continuous>  diphtheria/tetanus/pertussis (acellular) Vaccine (ADAcel) 0.5 milliLiter(s) IntraMuscular once  ibuprofen  Tablet. 600 milliGRAM(s) Oral every 6 hours  oxytocin Infusion 333.333 milliUNIT(s)/Min (1000 mL/Hr) IV Continuous <Continuous>  oxytocin Infusion 333.333 milliUNIT(s)/Min (1000 mL/Hr) IV Continuous <Continuous>  prenatal multivitamin 1 Tablet(s) Oral daily  sodium chloride 0.9% lock flush 3 milliLiter(s) IV Push every 8 hours    MEDICATIONS  (PRN):  benzocaine 20%/menthol 0.5% Spray 1 Spray(s) Topical every 6 hours PRN for Perineal discomfort  dibucaine 1% Ointment 1 Application(s) Topical every 6 hours PRN Perineal discomfort  diphenhydrAMINE 25 milliGRAM(s) Oral every 6 hours PRN Pruritus  hydrocortisone 1% Cream 1 Application(s) Topical every 6 hours PRN Moderate Pain (4-6)  lanolin Ointment 1 Application(s) Topical every 6 hours PRN nipple soreness  magnesium hydroxide Suspension 30 milliLiter(s) Oral two times a day PRN Constipation  oxyCODONE    IR 5 milliGRAM(s) Oral every 3 hours PRN Moderate to Severe Pain (4-10)  oxyCODONE    IR 5 milliGRAM(s) Oral once PRN Moderate to Severe Pain (4-10)  pramoxine 1%/zinc 5% Cream 1 Application(s) Topical every 4 hours PRN Moderate Pain (4-6)  simethicone 80 milliGRAM(s) Chew every 4 hours PRN Gas  witch hazel Pads 1 Application(s) Topical every 4 hours PRN Perineal discomfort      
OB Progress Note:  PPD#2    S: 40yo  PPD#2 s/p . Patient feels well. Pain is well controlled, tolerating regular diet, passing flatus, voiding spontaneously, ambulating without difficulty. Denies heavy vaginal bleeding, CP/SOB, N/V, lightheadedness/dizziness.     O:  Vitals:  Vital Signs Last 24 Hrs  T(C): 36.9 (2021 05:46), Max: 36.9 (2021 05:46)  T(F): 98.5 (2021 05:46), Max: 98.5 (2021 05:46)  HR: 70 (2021 05:46) (70 - 91)  BP: 130/73 (2021 05:46) (121/74 - 130/73)  BP(mean): 83 (2021 05:46) (83 - 83)  RR: 18 (2021 05:46) (17 - 18)  SpO2: 99% (2021 05:46) (99% - 100%)    MEDICATIONS  (STANDING):  acetaminophen   Tablet .. 975 milliGRAM(s) Oral <User Schedule>  dextrose 5% + lactated ringers. 1000 milliLiter(s) (125 mL/Hr) IV Continuous <Continuous>  diphtheria/tetanus/pertussis (acellular) Vaccine (ADAcel) 0.5 milliLiter(s) IntraMuscular once  ibuprofen  Tablet. 600 milliGRAM(s) Oral every 6 hours  oxytocin Infusion 333.333 milliUNIT(s)/Min (1000 mL/Hr) IV Continuous <Continuous>  oxytocin Infusion 333.333 milliUNIT(s)/Min (1000 mL/Hr) IV Continuous <Continuous>  prenatal multivitamin 1 Tablet(s) Oral daily  sodium chloride 0.9% lock flush 3 milliLiter(s) IV Push every 8 hours      Labs:  Blood type: O Positive  Rubella IgG: RPR: Negative                          9.0<L>   9.02 >-----------< 207    (  @ 13:38 )             28.1<L>                        8.7<L>   10.46 >-----------< 194    (  @ 07:08 )             27.1<L>                        10.3<L>   11.19<H> >-----------< 193    (  @ 14:46 )             33.3<L>                        11.8   8.37 >-----------< 231    (  @ 05:14 )             37.0                  Physical Exam:  General: NAD  Abdomen: soft, non-tender, non-distended, fundus firm  Vaginal: No heavy vaginal bleeding

## 2021-06-21 NOTE — PROGRESS NOTE ADULT - ASSESSMENT
Pt is a 42yo  PPD1 from  c/b PPD#0 urinary retention now resolved doing well postpartum.    - Continue with po analgesia  - Increase ambulation  - Continue regular diet  - IV lock  - No labs  - pt still considering which birth control she would like  - pt to be discharged home later today if vitals stable    Harry Ibrahim,PGY1
Pt is a 42yo  PPD2 from  c/b PPD#0 urinary retention now resolved doing well postpartum. Hct with significant drop but stable (trend above).    - Continue with po analgesia  - Increase ambulation  - Continue regular diet  - Discharge planning    Brittney Singh, PGY-1

## 2021-07-03 LAB — SURGICAL PATHOLOGY STUDY: SIGNIFICANT CHANGE UP

## 2021-07-15 ENCOUNTER — RESULT REVIEW (OUTPATIENT)
Age: 41
End: 2021-07-15

## 2024-05-30 NOTE — OB PROVIDER TRIAGE NOTE - NS_FHRACCEL_OBGYN_ALL_OB
May 31, 2024       Ronal RICO Jeniffer  [address] [place] [address]    Dear MsJonas WongSwift,    Your procedure is scheduled with Joleen Guan MD on September 10, 2024, at [facility]. The start time of your procedure is tentatively scheduled for 7:30 AM. You can expect to be contacted 1 to 3 days prior to the surgery to confirm arrival and surgery time. Occasionally these times may change.    Please arrive to register for your procedure at 5:30 AM. The address of the facility is:    [facility]   [address] [place] [address]  [phone]    The following appointment(s) have been scheduled for you:     4 week post op with Dr. Guan at the [facility] on October 11, 2024 at 1:45 PM    Here are instructions for your surgery:   Do not eat any solid food after midnight the night before your surgery.  If you are having a colon operation, refer to the attached handout for additional instructions.     You may continue to drink clear liquids until 3 hours prior to your procedure time (7:30 am).  Refer to the attached handout for more instructions regarding what you may drink.     Do not take any anti--inflammatory medications (aspirin, ibuprofen, naproxen, etc) for 7 days before surgery.  Acetaminophen (Tylenol) is acceptable.    If you take coumadin or other blood thinners contact your primary care physician regarding how long to hold prior to surgery.     Starting 10 days prior to your surgery, please do not take any Phentermine or other diet/weight loss products.  Continuing these medications in the 10 days before surgery will likely result in postponement due to anesthesia requirements.  Please consult with your prescribing physicians if you have any questions.     You will need someone to drive you home and remain with you up to 24 hours after you have been discharged.     If you have not received CHG wipes/wash as instructed by your surgeons  office, please contact the clinic to arrange a  date and time.    If you have any work related and/or disability forms that need to be completed, please bring these forms to:  [facility]. It takes approximately 7 to 10 business days to complete these forms.    If you have any questions or need to reschedule, please contact me at the telephone number and extension listed below.     Thank you,    Daly [phone]  Surgery Scheduler for Joleen Guan MD   [facility] Pre-Admit Department    Enclosures: [facility] Booklet                                                          Clear Liquid Diet  No Solid Food!   Avoid anything red or purple    Drinks that are ok:  Water  Sports drinks   Black coffee or tea (without milk or cream)  Carbonated beverages and sodas (Sprite, Seven-Up, Ginger ale)  Apple or white grape juice  Foods that are ok:  Clear broth   Hard candy  Jello  Popsicles (without fruit or yogurt)  Fruit ices  Sorbet  Things to avoid:   Any solid food  Fruit juice with pulp (orange or pineapple juice)  Milk or other dairy products  Alcoholic beverages   Present (15 x15 bpm)

## 2025-04-24 NOTE — OB RN DELIVERY SUMMARY - NSBABYASEPSISRSK_OBGYN_N_OB_NU
A1c was 8.0%    try to get it down under 7%.         Kidney and liver function are normal       cholesterol is normal at 116        triglycerides are normal at 101   prostate level was 8.13 ,   last year was 7.87        not much different.      Red and white blood cell counts were normal     platelet count was normal      thyroid function is normal     vitamin D is normal at 40         overall blood work is stable       just try to watch the sugar       and check with urology if you have any prostate problems   0.06

## 2025-06-05 NOTE — OB RN PATIENT PROFILE - NSTOBACCONEVERSMOKERY/N_GEN_A
Patient called the office today requesting to have an A1C lab to be added to lab orders per [facility]. I called patient to advised of 's message. Patient was confused and does not know how an A1C can be done when she doesn't fast. I explained to patient how an A1C is completed in office and explained to the best of my abilities why she needs to fast. Patient states that she understands and that she will be in office for her designated appointment.     No